# Patient Record
Sex: FEMALE | Race: WHITE
[De-identification: names, ages, dates, MRNs, and addresses within clinical notes are randomized per-mention and may not be internally consistent; named-entity substitution may affect disease eponyms.]

---

## 2018-12-28 ENCOUNTER — HOSPITAL ENCOUNTER (EMERGENCY)
Dept: HOSPITAL 97 - ER | Age: 63
Discharge: HOME | End: 2018-12-28
Payer: COMMERCIAL

## 2018-12-28 VITALS — OXYGEN SATURATION: 98 % | SYSTOLIC BLOOD PRESSURE: 148 MMHG | DIASTOLIC BLOOD PRESSURE: 79 MMHG

## 2018-12-28 VITALS — TEMPERATURE: 98.6 F
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
## 2018-12-28 DIAGNOSIS — M81.0: ICD-10-CM

## 2018-12-28 DIAGNOSIS — M16.11: ICD-10-CM

## 2018-12-28 DIAGNOSIS — S73.101A: Primary | ICD-10-CM

## 2018-12-28 PROCEDURE — 72170 X-RAY EXAM OF PELVIS: CPT

## 2018-12-28 PROCEDURE — 93971 EXTREMITY STUDY: CPT

## 2018-12-28 PROCEDURE — 99283 EMERGENCY DEPT VISIT LOW MDM: CPT

## 2018-12-28 NOTE — RAD REPORT
EXAM DESCRIPTION:  USExtremity Venous Uni Ltd12/28/2018 8:21 pm

 

CLINICAL HISTORY:  Right leg pain

 

COMPARISON:  None.

 

FINDINGS:  Right common femoral, superficial femoral, popliteal and right posterior tibial veins are 
compressible and demonstrate augmentation. Doppler demonstrates good flow.

 

IMPRESSION:  No evidence of deep venous thrombosis involving the right lower extremity.

## 2018-12-28 NOTE — ER
Nurse's Notes                                                                                     

 [facility]                                                                

Name: Kathi Massey                                                                                  

Age: 63 yrs                                                                                       

Sex: Female                                                                                       

: 1955                                                                                   

MRN: [id number]                                                                                   

Arrival Date: 2018                                                                          

Time: 17:25                                                                                       

Account#: [id number]                                                                            

Bed 20                                                                                            

Private MD: Jefferson Aaron R                                                         

Diagnosis: Sprain of hip                                                                          

                                                                                                  

Presentation:                                                                                     

                                                                                             

18:10 Presenting complaint: Patient states: Right groin pain for 2 weeks after twisting leg.  aj  

      Transition of care: patient was not received from another setting of care. Onset of         

      symptoms was 2018. Risk Assessment: Do you want to hurt yourself or            

      someone else? Patient reports no desire to harm self or others. Initial Sepsis Screen:      

      Does the patient meet any 2 criteria? No. Patient's initial sepsis screen is negative.      

      Does the patient have a suspected source of infection? No. Patient's initial sepsis         

      screen is negative. Care prior to arrival: None.                                            

18:10 Method Of Arrival: Ambulatory                                                           aj  

18:10 Acuity: NENKA 4                                                                           aj  

                                                                                                  

Triage Assessment:                                                                                

18:12 General: Appears in no apparent distress. comfortable, Behavior is calm, cooperative,   aj  

      appropriate for age. Pain: Complains of pain in right inner thigh and medial aspect of      

      right thigh. Neuro: Level of Consciousness is awake, alert, obeys commands, Oriented to     

      person, place, time, situation, Appropriate for age. Respiratory: Airway is patent          

      Respiratory effort is even, unlabored, Respiratory pattern is regular, symmetrical.         

      Derm: Skin is intact, is healthy with good turgor, Skin is pink, warm \T\ dry. normal.      

      Musculoskeletal: Reports pain in right inner thigh and medial aspect of right thigh.        

                                                                                                  

Historical:                                                                                       

- Allergies:                                                                                      

18:12 No Known Allergies;                                                                     aj  

- Home Meds:                                                                                      

18:12 baclofen 20 mg Oral tab 1 tab nightly [Active]; diclofenac oral oral [Active];          aj  

- PMHx:                                                                                           

18:12 Osteoporosis; herniated discs;                                                          aj  

- PSHx:                                                                                           

18:12 Hysterectomy; Carpal Tunnel Repair; foot;                                               aj  

                                                                                                  

- Immunization history:: Adult Immunizations up to date.                                          

- Social history:: Smoking status: Patient/guardian denies using tobacco.                         

- Ebola Screening: : Patient negative for fever greater than or equal to 101.5 degrees            

  Fahrenheit, and additional compatible Ebola Virus Disease symptoms Patient denies               

  exposure to infectious person Patient denies travel to an Ebola-affected area in the            

  21 days before illness onset No symptoms or risks identified at this time.                      

                                                                                                  

                                                                                                  

Screenin:20 Abuse screen: Denies threats or abuse. Denies injuries from another. Nutritional        ca1 

      screening: No deficits noted. Tuberculosis screening: No symptoms or risk factors           

      identified. Fall Risk None identified.                                                      

                                                                                                  

Assessment:                                                                                       

19:20 General: Appears in no apparent distress. comfortable, Behavior is calm, cooperative,   ca1 

      appropriate for age. Pain: Complains of pain in right leg Pain currently is 5 out of 10     

      on a pain scale. Quality of pain is described as squeezing, Pain began 3 weeks ago.         

      Neuro: Level of Consciousness is awake, alert, obeys commands, Oriented to person,          

      place, time, situation, Appropriate for age. Cardiovascular: Heart tones S1 S2 present      

      Capillary refill < 3 seconds Patient's skin is warm and dry. Edema is 1+ to right knee.     

      Respiratory: Airway is patent Trachea midline Respiratory effort is even, unlabored,        

      Respiratory pattern is regular, symmetrical, Breath sounds are clear bilaterally. GI:       

      Abdomen is round non-distended, Bowel sounds present X 4 quads. Abd is soft and non         

      tender X 4 quads. : No signs and/or symptoms were reported regarding the                  

      genitourinary system. EENT: No signs and/or symptoms were reported regarding the EENT       

      system. Derm: Skin is intact, is healthy with good turgor, Skin is pink, warm \T\ dry.      

      Musculoskeletal: Circulation, motion, and sensation intact. Range of motion: intact in      

      all extremities, Swelling present in right leg.                                             

20:37 Reassessment: Patient appears in no apparent distress at this time. Patient and/or      ca1 

      family updated on plan of care and expected duration. Pain level reassessed. Patient is     

      alert, oriented x 3, equal unlabored respirations, skin warm/dry/pink. Patient is           

      alert/active/playful, equal unlabored respirations, skin warm/dry/pink.                     

21:15 Reassessment: Patient appears in no apparent distress at this time. Patient and/or      ca1 

      family updated on plan of care and expected duration. Pain level reassessed. Patient is     

      alert, oriented x 3, equal unlabored respirations, skin warm/dry/pink. Patient is           

      alert/active/playful, equal unlabored respirations, skin warm/dry/pink.                     

                                                                                                  

Vital Signs:                                                                                      

18:12  / 85; Pulse 75; Resp 20; Temp 98.6; Pulse Ox 100% on R/A; Weight 75.3 kg; Height aj  

      5 ft. 1 in. (154.94 cm);                                                                    

19:20  / 69; Pulse 58; Resp 18; Pulse Ox 98% on R/A;                                    ca1 

20:37  / 71; Pulse 60; Resp 18; Pulse Ox 97% ;                                          ca1 

21:15  / 79; Pulse 62; Resp 18; Pulse Ox 98% on R/A;                                    ca1 

18:12 Body Mass Index 31.37 (75.30 kg, 154.94 cm)                                             aj  

                                                                                                  

ED Course:                                                                                        

17:25 Patient arrived in ED.                                                                  sb2 

17:25 Jefferson Aaron MD is Private Physician.                                    sb2 

18:11 Triage completed.                                                                       aj  

18:12 Arm band placed on left wrist. Patient placed in waiting room, Patient notified of wait aj  

      time.                                                                                       

19:03 Rajinder Smith PA is PHCP.                                                              [facility] 

19:03 Arturo Hooper MD is Attending Physician.                                             m 

19:20 Patient has correct armband on for positive identification. Bed in low position. Call   ca1 

      light in reach. Side rails up X 1.                                                          

19:43 Concepcion Novoa, RN is Primary Nurse.                                                      aa1 

19:59 Hip Right 2 View XRAY In Process Unspecified.                                           EDMS

19:59 Pelvis XRAY In Process Unspecified.                                                     EDMS

20:20 Ultrasound completed. Patient tolerated well.                                           sg3 

20:21 US Extremity Venous Unilateral Ltd In Process Unspecified.                              EDMS

20:29 Mandy Macias, RN is Primary Nurse.                                                      ca1 

21:08 Irving Pat MD is Referral Physician.                                               [facility] 

21:16 No provider procedures requiring assistance completed. Patient did not have IV access   ca1 

      during this emergency room visit.                                                           

                                                                                                  

Administered Medications:                                                                         

No medications were administered                                                                  

                                                                                                  

                                                                                                  

Outcome:                                                                                          

21:08 Discharge ordered by MD.                                                                m 

21:16 Discharged to home ambulatory.                                                          ca1 

21:16 Condition: stable                                                                           

21:16 Discharge instructions given to patient, Instructed on discharge instructions, follow       

      up and referral plans. Demonstrated understanding of instructions, follow-up care.          

21:18 Patient left the ED.                                                                    ca1 

                                                                                                  

Signatures:                                                                                       

Dispatcher [employer]                           EDMS                                                 

Concepcion Novoa RN                        RN   aa1                                                  

Paula Wade RN                       RN   aj                                                   

Mickail, NOHEMI Calvillo, Barb                               sg3                                                  

Tuyet Powell                               sb2                                                  

Mandy Macias, RN                        RN   ca1                                                  

                                                                                                  

**************************************************************************************************

## 2018-12-28 NOTE — RAD REPORT
EXAM DESCRIPTION:  RAD - Hip Right 2 View - 12/28/2018 7:59 pm

 

CLINICAL HISTORY:  Right hip pain

 

FINDINGS:  No fracture or dislocation is seen.

 

Mild to moderate osteoarthritis involves right hip consisting joint space narrowing and subchondral s
clerosis. Bones appear osteoporotic

## 2018-12-28 NOTE — EDPHYS
Physician Documentation                                                                           

 [facility]                                                                

Name: Kathi Massey                                                                                  

Age: 63 yrs                                                                                       

Sex: Female                                                                                       

: 1955                                                                                   

MRN: [id number]                                                                                   

Arrival Date: 2018                                                                          

Time: 17:25                                                                                       

Account#: [id number]                                                                            

Bed 20                                                                                            

Private MD: Jefferson Aaron R                                                         

ED Physician Arturo Hooper                                                                      

HPI:                                                                                              

                                                                                             

19:08 This 63 yrs old  Female presents to ER via Ambulatory with complaints of Leg   jmm 

      Pain.                                                                                       

19:08 The patient presents with an injury, pain. Onset: The symptoms/episode began/occurred   jmm 

      acutely, 2 week(s) ago. Modifying factors: The symptoms are alleviated by remaining         

      still, the symptoms are aggravated by movement. Associated signs and symptoms:              

      Pertinent negatives calf tenderness, fever, numbness. This is a 63 year old female with     

      a history of osteoporosis that presents to the ED with right hip pain after twisting.       

      Patient states the pain radiates down the right anterior thigh. Denies weakness or          

      fever. .                                                                                    

                                                                                                  

Historical:                                                                                       

- Allergies:                                                                                      

18:12 No Known Allergies;                                                                     aj  

- Home Meds:                                                                                      

18:12 baclofen 20 mg Oral tab 1 tab nightly [Active]; diclofenac oral oral [Active];          aj  

- PMHx:                                                                                           

18:12 Osteoporosis; herniated discs;                                                          aj  

- PSHx:                                                                                           

18:12 Hysterectomy; Carpal Tunnel Repair; foot;                                               aj  

                                                                                                  

- Immunization history:: Adult Immunizations up to date.                                          

- Social history:: Smoking status: Patient/guardian denies using tobacco.                         

- Ebola Screening: : Patient negative for fever greater than or equal to 101.5 degrees            

  Fahrenheit, and additional compatible Ebola Virus Disease symptoms Patient denies               

  exposure to infectious person Patient denies travel to an Ebola-affected area in the            

  21 days before illness onset No symptoms or risks identified at this time.                      

                                                                                                  

                                                                                                  

ROS:                                                                                              

19:08 Constitutional: Negative for fever, chills, and weight loss, Cardiovascular: Negative   jmm 

      for chest pain, palpitations, and edema, Respiratory: Negative for shortness of breath,     

      cough, wheezing, and pleuritic chest pain.                                                  

19:08 MS/extremity: Positive for pain.                                                            

19:08 All other systems are negative.                                                             

                                                                                                  

Exam:                                                                                             

19:08 Head/Face:  atraumatic. Eyes:  EOMI, no conjunctival erythema appreciated ENT:  Moist   jmm 

      Mucus Membranes Neck:  Trachea midline, Supple Chest/axilla:  Normal chest wall             

      appearance and motion.   Cardiovascular:  Regular rate and rhythm.  No edema                

      appreciated Respiratory:  Normal respirations, no respiratory distress appreciated          

      Back:  Normal ROM                                                                           

19:08 Constitutional: The patient appears in no acute distress, alert, awake.                     

19:08 Musculoskeletal/extremity: ROM: pain with internal rotation of the right hip.   no          

      swelling is appreciated to the right lower extremity.  full dorsalis pedis pulse, NVI.      

19:08 Skin: Appearance: Color: normal in color.                                                   

19:08 Neuro: Orientation: is normal, Mentation: is normal, Memory: is normal.                     

19:08 Psych: Behavior/mood is pleasant, cooperative.                                              

                                                                                                  

Vital Signs:                                                                                      

18:12  / 85; Pulse 75; Resp 20; Temp 98.6; Pulse Ox 100% on R/A; Weight 75.3 kg; Height aj  

      5 ft. 1 in. (154.94 cm);                                                                    

19:20  / 69; Pulse 58; Resp 18; Pulse Ox 98% on R/A;                                    ca1 

20:37  / 71; Pulse 60; Resp 18; Pulse Ox 97% ;                                          ca1 

21:15  / 79; Pulse 62; Resp 18; Pulse Ox 98% on R/A;                                    ca1 

18:12 Body Mass Index 31.37 (75.30 kg, 154.94 cm)                                             aj  

                                                                                                  

MDM:                                                                                              

19:08 Patient medically screened.                                                             marilee 

21:07 Data reviewed: vital signs, nurses notes. Counseling: I had a detailed discussion with  brian 

      the patient and/or guardian regarding: the historical points, exam findings, and any        

      diagnostic results supporting the discharge/admit diagnosis, radiology results, the         

      need for outpatient follow up, to return to the emergency department if symptoms worsen     

      or persist or if there are any questions or concerns that arise at home.                    

                                                                                                  

                                                                                             

19:23 Order name: Hip Right 2 View XRAY; Complete Time: 20:38                                 [facility] 

                                                                                             

19:23 Order name: Pelvis XRAY; Complete Time: 20:38                                           [facility] 

                                                                                             

19:23 Order name: US Extremity Venous Unilateral Ltd; Complete Time: 20:38                    [facility] 

                                                                                                  

Administered Medications:                                                                         

No medications were administered                                                                  

                                                                                                  

                                                                                                  

Disposition:                                                                                      

18 21:08 Discharged to Home. Impression: Sprain of hip.                                     

- Condition is Stable.                                                                            

- Discharge Instructions: Hip Pain.                                                               

                                                                                                  

- Medication Reconciliation Form, Thank You Letter, Antibiotic Education, Prescription            

  Opioid Use form.                                                                                

- Follow up: Irving Pat MD; When: 2 - 3 days; Reason: Recheck today's complaints,            

  Continuance of care, Re-evaluation by your physician.                                           

                                                                                                  

                                                                                                  

                                                                                                  

Addendum:                                                                                         

2019                                                                                        

     11:08 Co-signature as Attending Physician, Arturo Hooper MD I agree with the assessment and  c
ha

           plan of care.                                                                          

                                                                                                  

Signatures:                                                                                       

Dispatcher [employer]                           Paula Perez, RN                       RN   Arturo Curiel MD MD cha Mickail, Joel, PA PA   Mandy Albert RN                        RN   ca1                                                  

                                                                                                  

Corrections: (The following items were deleted from the chart)                                    

                                                                                             

21:18 21:08 2018 21:08 Discharged to Home. Impression: Sprain of hip. Condition is      ca1 

      Stable. Forms are Medication Reconciliation Form, Thank You Letter, Antibiotic              

      Education, Prescription Opioid Use. Follow up: Irving Pat; When: 2 - 3 days; Reason:     

      Recheck today's complaints, Continuance of care, Re-evaluation by your physician. brian       

                                                                                                  

**************************************************************************************************

## 2018-12-28 NOTE — RAD REPORT
EXAM DESCRIPTION:  RAD - Pelvis - 12/28/2018 7:59 pm

 

CLINICAL HISTORY:  Right hip pain

 

FINDINGS:  No fracture or dislocation is seen.

 

Mild to moderate osteoarthritis involves the hips mainly consisting joint space narrowing and subchon
dral sclerosis.

 

Bones appear osteoporotic

## 2019-09-07 ENCOUNTER — HOSPITAL ENCOUNTER (EMERGENCY)
Dept: HOSPITAL 97 - ER | Age: 64
Discharge: HOME | End: 2019-09-07
Payer: COMMERCIAL

## 2019-09-07 DIAGNOSIS — H81.10: Primary | ICD-10-CM

## 2019-09-07 PROCEDURE — 99283 EMERGENCY DEPT VISIT LOW MDM: CPT

## 2019-09-07 NOTE — EDPHYS
Physician Documentation                                                                           

 [facility]                                                                 

Name: Kathi Massey                                                                                  

Age: 64 yrs                                                                                       

Sex: Female                                                                                       

: 1955                                                                                   

MRN: [id number]                                                                                   

Arrival Date: 2019                                                                          

Time: 19:56                                                                                       

Account#: [id number]                                                                            

Bed 14                                                                                            

Private MD: Jefferson Aaron R                                                         

ED Physician Mil Tran                                                                       

HPI:                                                                                              

                                                                                             

20:48 This 64 yrs old  Female presents to ER via Ambulatory with complaints of       gs  

      Nausea, Dizziness.                                                                          

20:48 The patient presents with sense of spinning. Onset: The symptoms/episode began/occurred gs  

      this morning. Context: occurred while the patient was sitting. Modifying factors: the       

      symptoms are aggravated by movement of head, standing up, changing position. Associated     

      signs and symptoms: Pertinent positives: nausea, vomiting. Severity of symptoms: At         

      their worst the symptoms were severe in the emergency department the symptoms have          

      improved markedly. The patient has experienced similar episodes in the past, a few          

      times.                                                                                      

                                                                                                  

Historical:                                                                                       

- Allergies:                                                                                      

20:08 No Known Allergies;                                                                     aj1 

- Home Meds:                                                                                      

20:08 Flexeril Oral [Active]; Naprosyn Oral [Active];                                         aj1 

- PMHx:                                                                                           

20:08 herniated discs; Osteoporosis;                                                          aj1 

                                                                                                  

- Immunization history:: Flu vaccine is up to date.                                               

- Social history:: Smoking status: Patient/guardian denies using tobacco.                         

- Ebola Screening: : Patient denies travel to an Ebola-affected area in the 21 days               

  before illness onset.                                                                           

                                                                                                  

                                                                                                  

ROS:                                                                                              

20:48 All other systems are negative.                                                         gs  

                                                                                                  

Exam:                                                                                             

20:48 Head/Face:  Normocephalic, atraumatic. Eyes:  Pupils equal round and reactive to light, gs  

      extra-ocular motions intact.  Lids and lashes normal.  Conjunctiva and sclera are           

      non-icteric and not injected.  Cornea within normal limits.  Periorbital areas with no      

      swelling, redness, or edema. ENT:  Nares patent. No nasal discharge, no septal              

      abnormalities noted.  Tympanic membranes are normal and external auditory canals are        

      clear.  Oropharynx with no redness, swelling, or masses, exudates, or evidence of           

      obstruction, uvula midline.  Mucous membranes moist. Neck:  Trachea midline, no             

      thyromegaly or masses palpated, and no cervical lymphadenopathy.  Supple, full range of     

      motion without nuchal rigidity, or vertebral point tenderness.  No Meningismus.             

      Chest/axilla:  Normal chest wall appearance and motion.  Nontender with no deformity.       

      No lesions are appreciated. Cardiovascular:  Regular rate and rhythm with a normal S1       

      and S2.  No gallops, murmurs, or rubs.  Normal PMI, no JVD.  No pulse deficits.             

      Respiratory:  Lungs have equal breath sounds bilaterally, clear to auscultation and         

      percussion.  No rales, rhonchi or wheezes noted.  No increased work of breathing, no        

      retractions or nasal flaring. Abdomen/GI:  Soft, non-tender, with normal bowel sounds.      

      No distension or tympany.  No guarding or rebound.  No evidence of tenderness               

      throughout. Back:  No spinal tenderness.  No costovertebral tenderness.  Full range of      

      motion. Skin:  Warm, dry with normal turgor.  Normal color with no rashes, no lesions,      

      and no evidence of cellulitis. MS/ Extremity:  Pulses equal, no cyanosis.                   

      Neurovascular intact.  Full, normal range of motion.                                        

20:48 Constitutional: The patient appears alert, awake.                                           

20:48 Eyes: Nystagmus: is not appreciated.                                                        

20:48 Neuro: Orientation: is normal, to person, place, time \T\ situation. Mentation: is          

      normal, Memory: is normal, Cranial nerves: CN II- XII are normal as tested, Cerebellar      

      function: normal finger to nose testing, Motor: strength is normal, Sensation: no           

      obvious gross deficits.                                                                     

                                                                                                  

Vital Signs:                                                                                      

20:08  / 70; Pulse 101; Resp 18; Temp 98.4; Pulse Ox 98% on R/A; Weight 77.11 kg (R);   aj1 

      Height 5 ft. 0 in. (152.40 cm) (R); Pain 0/10;                                              

20:35  / 68; Pulse 94; Resp 18; Pulse Ox 97% on R/A;                                    lp1 

20:08 Body Mass Index 33.20 (77.11 kg, 152.40 cm)                                             aj1 

                                                                                                  

NIH Stroke Scale Scores:                                                                          

20:48 NIHSS Score: 0                                                                          gs  

                                                                                                  

MDM:                                                                                              

20:48 Patient medically screened.                                                             gs  

20:48 Differential diagnosis: idiopathic dizziness, vertigo. Data reviewed: vital signs,      gs  

      nurses notes. Counseling: I had a detailed discussion with the patient and/or guardian      

      regarding: the historical points, exam findings, and any diagnostic results supporting      

      the discharge/admit diagnosis, the need for outpatient follow up. Response to               

      treatment: the patient's symptoms have resolved after treatment, and as a result, I         

      will discharge patient.                                                                     

                                                                                                  

Administered Medications:                                                                         

No medications were administered                                                                  

                                                                                                  

                                                                                                  

Disposition:                                                                                      

19 20:53 Discharged to Home. Impression: Benign paroxysmal vertigo.                         

- Condition is Stable.                                                                            

- Discharge Instructions: Benign Positional Vertigo.                                              

- Prescriptions for Antivert 25 mg Oral Tablet - take 1 tablet by ORAL route every 8              

  hours As needed; 15 tablet.                                                                     

- Work release form, Medication Reconciliation Form, Thank You Letter, Antibiotic                 

  Education, Prescription Opioid Use form.                                                        

- Follow up: Private Physician; When: 2 - 3 days; Reason: Re-evaluation by your                   

  physician.                                                                                      

                                                                                                  

                                                                                                  

                                                                                                  

                                                                                                  

NIH Stroke Scale - NIH Stroke Score                                                               

Date: 2019                                                                                  

Time: 20:48                                                                                       

Total Score = 0                                                                                   

  1a. Level of Consciousness (LOC) - 0(Alert)                                                     

  1b. Level of Consciousness (LOC) (Year \T\ Age) - 0(Both)                                       

  1c. LOC Commands (Open \T\ Closes Eyes/) - 0(Both)                                          

   2. Best Gaze (Lateral Gaze Paresis) - 0(Normal)                                                

   3. Visual Field Loss - 0(No visual loss)                                                       

   4. Facial Palsy - 0(Normal)                                                                    

  5a. Left Arm: Motor (10-second hold) - 0(No drift)                                              

  5b. Right Arm: Motor (10-second hold) - 0(No drift)                                             

  6a. Left Leg: Motor (5-second hold - always test supine) - 0(No drift)                          

  6b. Right Leg: Motor (5-second hold - always test supine) - 0(No drift)                         

   7. Limb Ataxia (finger/nose \T\ heel/shin - test with eyes open) - 0(Absent)                   

   8. Sensory Loss (pinprick arms/legs/face) - 0(Normal)                                          

   9. Best Language: Aphasia (description/naming/reading) - 0(No aphasia)                         

  10. Dysarthria (speech clarity - read or repeat words) - 0(Normal)                              

  11. Extinction and Inattention (visual/tactile/auditory/spatial/personal) - 0(No                

      abnormality)                                                                                

Initials:                                                                                       

                                                                                                  

Signatures:                                                                                       

Kimberli Bajwa RN                     RN   aj1                                                  

Agatha Jalloh RN                         RN   lp1                                                  

Mil Tran MD MD gs                                                   

                                                                                                  

Corrections: (The following items were deleted from the chart)                                    

21:09 20:53 2019 20:53 Discharged to Home. Impression: Benign paroxysmal        lp1         

      vertigo. Condition is Stable. Forms are Medication Reconciliation Form, Thank               

      You Letter, Antibiotic Education, Prescription Opioid Use. Follow up: Private               

      Physician; When: 2 - 3 days; Reason: Re-evaluation by your physician. gs                    

                                                                                                  

**************************************************************************************************

## 2019-09-07 NOTE — ER
Nurse's Notes                                                                                     

 [facility]                                                                 

Name: Kathi Massye                                                                                  

Age: 64 yrs                                                                                       

Sex: Female                                                                                       

: 1955                                                                                   

MRN: [id number]                                                                                   

Arrival Date: 2019                                                                          

Time: 19:56                                                                                       

Account#: [id number]                                                                            

Bed 14                                                                                            

Private MD: Jefferson Aaron R                                                         

Diagnosis: Benign paroxysmal vertigo                                                              

                                                                                                  

Presentation:                                                                                     

                                                                                             

20:06 Presenting complaint: Patient states: Nausea, dizziness, not feeling well that started  aj1 

      this morning. Denies vomiting. Reports chills. Transition of care: patient was not          

      received from another setting of care. Onset of symptoms was 2019. Risk       

      Assessment: Do you want to hurt yourself or someone else? Patient reports no desire to      

      harm self or others. Initial Sepsis Screen: Does the patient meet any 2 criteria? HR >      

      90 bpm. No. Patient's initial sepsis screen is negative. Does the patient have a            

      suspected source of infection? No. Patient's initial sepsis screen is negative. Care        

      prior to arrival: None.                                                                     

20:06 Method Of Arrival: Ambulatory                                                           aj1 

20:06 Acuity: NNEKA 3                                                                           aj1 

                                                                                                  

Triage Assessment:                                                                                

20:08 General: Appears in no apparent distress. comfortable, Behavior is calm, cooperative,   aj1 

      appropriate for age. Pain: Denies pain. Neuro: Level of Consciousness is awake, alert,      

      obeys commands. Cardiovascular: Patient's skin is warm and dry. Respiratory: Airway is      

      patent Respiratory effort is even, unlabored, Respiratory pattern is regular,               

      symmetrical. GI: Reports nausea.                                                            

                                                                                                  

Historical:                                                                                       

- Allergies:                                                                                      

20:08 No Known Allergies;                                                                     aj1 

- Home Meds:                                                                                      

20:08 Flexeril Oral [Active]; Naprosyn Oral [Active];                                         aj1 

- PMHx:                                                                                           

20:08 herniated discs; Osteoporosis;                                                          aj1 

                                                                                                  

- Immunization history:: Flu vaccine is up to date.                                               

- Social history:: Smoking status: Patient/guardian denies using tobacco.                         

- Ebola Screening: : Patient denies travel to an Ebola-affected area in the 21 days               

  before illness onset.                                                                           

                                                                                                  

                                                                                                  

Screenin:35 Abuse screen: Denies threats or abuse. Denies injuries from another. Nutritional        lp1 

      screening: No deficits noted. Tuberculosis screening: No symptoms or risk factors           

      identified. Fall Risk None identified.                                                      

                                                                                                  

Assessment:                                                                                       

20:34 General: Appears in no apparent distress. Behavior is calm, cooperative, appropriate    lp1 

      for age. Pain: Denies pain. Neuro: Level of Consciousness is awake, alert, obeys            

      commands, Oriented to person, place, time, situation, Gait is steady, Reports               

      dizziness. Cardiovascular: Patient's skin is warm and dry. Respiratory: Respiratory         

      effort is even, unlabored. GI: Abdomen is non-distended, Abd is soft and non tender X 4     

      quads. Reports nausea. : No signs and/or symptoms were reported regarding the             

      genitourinary system. EENT: No signs and/or symptoms were reported regarding the EENT       

      system. Derm: Skin is pink, warm \T\ dry. Musculoskeletal: No deficits noted.               

                                                                                                  

Vital Signs:                                                                                      

20:08  / 70; Pulse 101; Resp 18; Temp 98.4; Pulse Ox 98% on R/A; Weight 77.11 kg (R);   aj1 

      Height 5 ft. 0 in. (152.40 cm) (R); Pain 0/10;                                              

20:35  / 68; Pulse 94; Resp 18; Pulse Ox 97% on R/A;                                    lp1 

20:08 Body Mass Index 33.20 (77.11 kg, 152.40 cm)                                             aj1 

                                                                                                  

NIH Stroke Scale Scores:                                                                          

20:48 NIHSS Score: 0                                                                            

                                                                                                  

ED Course:                                                                                        

19:56 Patient arrived in ED.                                                                  mr  

19:56 Jefferson Aaron MD is Private Physician.                                    mr  

20:07 Triage completed.                                                                       aj1 

20:08 Arm band placed on Patient placed in an exam room.                                      aj1 

20:21 Agatha Jalloh, RN is Primary Nurse.                                                       lp1 

20:21 Mil Tran MD is Attending Physician.                                              gs  

20:35 Patient has correct armband on for positive identification. Placed in gown. Pulse ox    lp1 

      on. NIBP on.                                                                                

21:06 No provider procedures requiring assistance completed. Patient did not have IV access   lp1 

      during this emergency room visit.                                                           

                                                                                                  

Administered Medications:                                                                         

No medications were administered                                                                  

                                                                                                  

                                                                                                  

Outcome:                                                                                          

20:53 Discharge ordered by MD.                                                                gs  

21:06 Discharged to home ambulatory.                                                          lp1 

21:06 Condition: good                                                                             

21:06 Discharge instructions given to patient, Instructed on discharge instructions, follow       

      up and referral plans. medication usage, Demonstrated understanding of instructions,        

      follow-up care, medications, Prescriptions given X 1.                                       

21:09 Patient left the ED.                                                                    lp1 

                                                                                                  

                                                                                                  

NIH Stroke Scale - NIH Stroke Score                                                               

Date: 2019                                                                                  

Time: 20:48                                                                                       

Total Score = 0                                                                                   

  1a. Level of Consciousness (LOC) - 0(Alert)                                                     

  1b. Level of Consciousness (LOC) (Year \T\ Age) - 0(Both)                                       

  1c. LOC Commands (Open \T\ Closes Eyes/) - 0(Both)                                          

   2. Best Gaze (Lateral Gaze Paresis) - 0(Normal)                                                

   3. Visual Field Loss - 0(No visual loss)                                                       

   4. Facial Palsy - 0(Normal)                                                                    

  5a. Left Arm: Motor (10-second hold) - 0(No drift)                                              

  5b. Right Arm: Motor (10-second hold) - 0(No drift)                                             

  6a. Left Leg: Motor (5-second hold - always test supine) - 0(No drift)                          

  6b. Right Leg: Motor (5-second hold - always test supine) - 0(No drift)                         

   7. Limb Ataxia (finger/nose \T\ heel/shin - test with eyes open) - 0(Absent)                   

   8. Sensory Loss (pinprick arms/legs/face) - 0(Normal)                                          

   9. Best Language: Aphasia (description/naming/reading) - 0(No aphasia)                         

  10. Dysarthria (speech clarity - read or repeat words) - 0(Normal)                              

  11. Extinction and Inattention (visual/tactile/auditory/spatial/personal) - 0(No                

      abnormality)                                                                                

Initials:                                                                                       

                                                                                                  

Signatures:                                                                                       

Kimberli Bajwa, RN                     RN   aj1                                                  

Diana Banda                                 mr                                                   

Agatha Jalloh, THOMAS                         RN   lp1                                                  

Mil Tran MD MD                                                      

                                                                                                  

**************************************************************************************************

## 2019-09-08 VITALS — SYSTOLIC BLOOD PRESSURE: 120 MMHG | DIASTOLIC BLOOD PRESSURE: 68 MMHG | OXYGEN SATURATION: 97 %

## 2019-09-08 VITALS — TEMPERATURE: 98.4 F

## 2020-10-01 LAB
BUN BLD-MCNC: 13 MG/DL (ref 7–18)
GLUCOSE SERPLBLD-MCNC: 85 MG/DL (ref 74–106)
HCT VFR BLD CALC: 36.2 % (ref 36–45)
INR BLD: 0.98
LYMPHOCYTES # SPEC AUTO: 2.9 K/UL (ref 0.7–4.9)
PMV BLD: 8.5 FL (ref 7.6–11.3)
POTASSIUM SERPL-SCNC: 4 MMOL/L (ref 3.5–5.1)
RBC # BLD: 3.79 M/UL (ref 3.86–4.86)
UA DIPSTICK W REFLEX MICRO PNL UR: (no result)

## 2020-10-06 ENCOUNTER — HOSPITAL ENCOUNTER (OUTPATIENT)
Dept: HOSPITAL 97 - OR | Age: 65
LOS: 1 days | Discharge: HOME HEALTH | End: 2020-10-07
Attending: OBSTETRICS & GYNECOLOGY
Payer: COMMERCIAL

## 2020-10-06 VITALS — OXYGEN SATURATION: 94 %

## 2020-10-06 VITALS — BODY MASS INDEX: 32.8 KG/M2

## 2020-10-06 DIAGNOSIS — Z83.3: ICD-10-CM

## 2020-10-06 DIAGNOSIS — Z80.6: ICD-10-CM

## 2020-10-06 DIAGNOSIS — N99.3: Primary | ICD-10-CM

## 2020-10-06 DIAGNOSIS — N95.2: ICD-10-CM

## 2020-10-06 DIAGNOSIS — N39.3: ICD-10-CM

## 2020-10-06 DIAGNOSIS — N73.6: ICD-10-CM

## 2020-10-06 DIAGNOSIS — Z82.61: ICD-10-CM

## 2020-10-06 DIAGNOSIS — Z20.828: ICD-10-CM

## 2020-10-06 DIAGNOSIS — Z82.49: ICD-10-CM

## 2020-10-06 PROCEDURE — 86900 BLOOD TYPING SEROLOGIC ABO: CPT

## 2020-10-06 PROCEDURE — 90471 IMMUNIZATION ADMIN: CPT

## 2020-10-06 PROCEDURE — 81003 URINALYSIS AUTO W/O SCOPE: CPT

## 2020-10-06 PROCEDURE — 49329 UNLSTD LAPS PX ABD PERTM&OMN: CPT

## 2020-10-06 PROCEDURE — 85610 PROTHROMBIN TIME: CPT

## 2020-10-06 PROCEDURE — 85730 THROMBOPLASTIN TIME PARTIAL: CPT

## 2020-10-06 PROCEDURE — 85025 COMPLETE CBC W/AUTO DIFF WBC: CPT

## 2020-10-06 PROCEDURE — 0UT24ZZ RESECTION OF BILATERAL OVARIES, PERCUTANEOUS ENDOSCOPIC APPROACH: ICD-10-PCS

## 2020-10-06 PROCEDURE — 0DNW4ZZ RELEASE PERITONEUM, PERCUTANEOUS ENDOSCOPIC APPROACH: ICD-10-PCS

## 2020-10-06 PROCEDURE — 0UT74ZZ RESECTION OF BILATERAL FALLOPIAN TUBES, PERCUTANEOUS ENDOSCOPIC APPROACH: ICD-10-PCS

## 2020-10-06 PROCEDURE — 80048 BASIC METABOLIC PNL TOTAL CA: CPT

## 2020-10-06 PROCEDURE — 94010 BREATHING CAPACITY TEST: CPT

## 2020-10-06 PROCEDURE — 58661 LAPAROSCOPY REMOVE ADNEXA: CPT

## 2020-10-06 PROCEDURE — 0USG4ZZ REPOSITION VAGINA, PERCUTANEOUS ENDOSCOPIC APPROACH: ICD-10-PCS

## 2020-10-06 PROCEDURE — 36415 COLL VENOUS BLD VENIPUNCTURE: CPT

## 2020-10-06 PROCEDURE — 88305 TISSUE EXAM BY PATHOLOGIST: CPT

## 2020-10-06 PROCEDURE — 86901 BLOOD TYPING SEROLOGIC RH(D): CPT

## 2020-10-06 PROCEDURE — 86850 RBC ANTIBODY SCREEN: CPT

## 2020-10-06 PROCEDURE — 57425 LAPAROSCOPY SURG COLPOPEXY: CPT

## 2020-10-06 RX ADMIN — SODIUM CHLORIDE, SODIUM LACTATE, POTASSIUM CHLORIDE, AND CALCIUM CHLORIDE ONE MLS: .6; .31; .03; .02 INJECTION, SOLUTION INTRAVENOUS at 14:05

## 2020-10-06 RX ADMIN — SODIUM CHLORIDE, SODIUM LACTATE, POTASSIUM CHLORIDE, AND CALCIUM CHLORIDE ONE MLS: .6; .31; .03; .02 INJECTION, SOLUTION INTRAVENOUS at 15:05

## 2020-10-06 RX ADMIN — BUPIVACAINE HYDROCHLORIDE ONE ML: 2.5 INJECTION, SOLUTION EPIDURAL; INFILTRATION; INTRACAUDAL at 12:23

## 2020-10-06 RX ADMIN — BUPIVACAINE HYDROCHLORIDE ONE ML: 2.5 INJECTION, SOLUTION EPIDURAL; INFILTRATION; INTRACAUDAL at 12:12

## 2020-10-06 RX ADMIN — HYDROMORPHONE HYDROCHLORIDE ONE MG: 1 INJECTION, SOLUTION INTRAMUSCULAR; INTRAVENOUS; SUBCUTANEOUS at 17:58

## 2020-10-06 RX ADMIN — HYDROMORPHONE HYDROCHLORIDE ONE MG: 1 INJECTION, SOLUTION INTRAMUSCULAR; INTRAVENOUS; SUBCUTANEOUS at 18:03

## 2020-10-06 RX ADMIN — SODIUM CHLORIDE, SODIUM LACTATE, POTASSIUM CHLORIDE, AND CALCIUM CHLORIDE SCH MLS: .6; .31; .03; .02 INJECTION, SOLUTION INTRAVENOUS at 19:20

## 2020-10-06 NOTE — CON
Date of Consultation:  10/06/2020



Reason:  The patient needs dissection of the sacral promontory and the presacral area for placement o
f the mesh and Dr. Cooper asked me to help assist in this region as it is a complicated dissection.
  Therefore, I scrubbed in and at this point, peritoneal surface was identified and careful dissectio
n was done to the sacral promontory.  Middle sacral and branches were cauterized.  The remainder of t
he peritoneum was opened all the way to where the mesh was being attached in the pelvis and then afte
r adequate dissection was done and bleeding was controlled, no evidence of rectal injury was noted.  
Dr. Cooper continued with the case.





AS/ZHOU

DD:  10/06/2020 15:27:00Voice ID:  [id number]

DT:  10/06/2020 19:50:37Report ID:  [id number]

## 2020-10-07 VITALS — SYSTOLIC BLOOD PRESSURE: 135 MMHG | TEMPERATURE: 98.5 F | DIASTOLIC BLOOD PRESSURE: 73 MMHG

## 2020-10-07 LAB
BUN BLD-MCNC: 11 MG/DL (ref 7–18)
GLUCOSE SERPLBLD-MCNC: 92 MG/DL (ref 74–106)
HCT VFR BLD CALC: 33.7 % (ref 36–45)
LYMPHOCYTES # SPEC AUTO: 3 K/UL (ref 0.7–4.9)
PMV BLD: 8.8 FL (ref 7.6–11.3)
POTASSIUM SERPL-SCNC: 4.2 MMOL/L (ref 3.5–5.1)
RBC # BLD: 3.58 M/UL (ref 3.86–4.86)

## 2020-10-07 RX ADMIN — SODIUM CHLORIDE, SODIUM LACTATE, POTASSIUM CHLORIDE, AND CALCIUM CHLORIDE SCH: .6; .31; .03; .02 INJECTION, SOLUTION INTRAVENOUS at 10:00

## 2020-10-07 RX ADMIN — SODIUM CHLORIDE, SODIUM LACTATE, POTASSIUM CHLORIDE, AND CALCIUM CHLORIDE SCH MLS: .6; .31; .03; .02 INJECTION, SOLUTION INTRAVENOUS at 02:45

## 2020-10-08 NOTE — XMS REPORT
[facility]

                            Created on:2020



Patient:Kathi Massey

Sex:Female

:1955

External Reference #:[id number]





Demographics







                          Address                   [address] [place] [address]

 

                          Phone                     [phone]

 

                          Preferred Language        en

 

                          Marital Status            Unknown

 

                          Jain Affiliation     Unknown

 

                          Race                      White

 

                          Ethnic Group              Unknown









Author







                          Organization              [employer]









Care Team Providers







                    Name                Role                Phone

 

                    Shamar Stew       Provider Role       Unavailable









Allergies, Adverse Reactions, Alerts







                    Substance           Reaction            Event Type

 

                    N.K.D.A.            Info Not Available  Non Drug Allergy







Problems







             Problem Type Condition    Code         Onset Dates  Condition Statu

s

 

             Assessment   Medicare annual wellness visit, Z00.00                

    Active



                          subsequent                             

 

             Problem      Lumbago with sciatica, right side M54.41              

      Active

 

             Assessment   Asymptomatic menopausal state Z78.0                   

  Active

 

             Assessment   Encounter for screening mammogram Z12.31              

      Active



                          for malignant neoplasm of breast                      

     

 

             Problem      Female bladder prolapse N81.10                    Acti

ve

 

             Problem      History of partial hysterectomy Z90.711               

    Active

 

             Problem      Primary osteoarthritis of left hip M16.12             

       Active

 

             Problem      Pain in right hip M25.551                   Active

 

             Problem      Other chronic pain G89.29                    Active

 

             Problem      Primary osteoarthritis of left knee M17.12            

        Active

 

             Problem      Primary osteoarthritis of right hip M16.11            

        Active







Medications







        Medication Code    Code    Instructions Start   End     Status  Dosage



                System                  Date    Date            

 

        Calcium NDC     0                               Active  not



                                                                defined

 

        Vitamin D-3 NDC     [address]-77                         Active  not



                                                                defined

 

        Fish Oil NDC     [address]-51                         Active  not



                                                                defined

 

        Meloxicam NDC     [id number] 7.5 MG Orally                 Active  1 ta

blet



                                Once a day                         

 

        Vitamin B 12 NDC     17522-88641                         Active  not



                                                                defined







Results

No Known Results



Summary Purpose

[employer] Submission

## 2020-10-08 NOTE — XMS REPORT
Continuity of Care Document

                           Created on:2020



Patient:KWAN RUVALCABA

Sex:Female

:1955

External Reference #:[id number]





Demographics







                          Address                   [address]



                                                    [address] 



                                                    [place] [address]

 

                          Home Phone                [phone]

 

                          Email Address             [email or web address]

 

                          Preferred Language        en

 

                          Marital Status            Unknown

 

                          Mu-ism Affiliation     Unknown

 

                          Race                      Unknown

 

                          Additional Race(s)        White

 

                          Ethnic Group              Unknown









Author







                          Organization              [facility]

t

 

                          Address                   [address]



                                                    [place] [address]

 

                          Phone                     [phone]









Care Team Providers







                    Name                Role                Phone

 

                    Unavailable         Unavailable         Unavailable









Problems







       Condition Condition Condition Status Onset  Resolution Last   Treating Co

mments 

Source



       Name   Details Category        Date   Date   Treatment Clinician        



                                                 Date                 

 

       Primary Primary Problem Active                                    [facility]



       osteoarthr osteoarthr                                                  Anamaria

kes -



       itis of itis of                                                  [facility]



       left knee left knee                                                  l



                                                                      Outpati



                                                                      ent



                                                                      Clinics

 

       Female Female Problem Active                                    [facility]



       bladder bladder                                                  [facility]



       prolapse prolapse                                                  [facility]

a



                                                                      l



                                                                      Outpati



                                                                      ent



                                                                      Clinics

 

       History of History of Problem Active                                    [facility]

[facility]



       partial partial                                                  [facility]



       hysterecto hysterecto                                                  Me

moria



       my     my                                                      l



                                                                      Outpati



                                                                      ent



                                                                      Clinics

 

       Primary Primary Problem Active                                    [facility]



       osteoarthr osteoarthr                                                  Anamaria

kes -



       itis of itis of                                                  [facility]



       right hip right hip                                                  l



                                                                      Outpati



                                                                      ent



                                                                      Clinics

 

       Other  Other  Problem Active                                    [facility]



       chronic chronic                                                  [facility]



       pain   pain                                                    [facility]



                                                                      l



                                                                      Outpati



                                                                      ent



                                                                      Clinics

 

       Lumbago Lumbago Problem Active                                    [facility]



       with   with                                                    [facility]



       sciatica, sciatica,                                                  Walter

enrrique



       right side right side                                                  l



                                                                      Outpati



                                                                      ent



                                                                      Clinics

 

       Pain in Pain in Problem Active                                    [facility]



       right hip right hip                                                  [facility]

[facility] -



                                                                      [facility]



                                                                      l



                                                                      Outpati



                                                                      ent



                                                                      Clinics

 

       Primary Primary Problem Active                                    [facility]



       osteoarthr osteoarthr                                                  Anamaria

kes -



       itis of itis of                                                  [facility]



       left hip left hip                                                  l



                                                                      Outpati



                                                                      ent



                                                                      Clinics







Allergies, Adverse Reactions, Alerts

This patient has no known allergies or adverse reactions.



Medications







       Ordered Filled Start  Stop   Current Ordering Indication Dosage Frequency

 Signature

                    Comments            Components          Source



     Medication Medication Date Date Medication? Clinician                (SIG) 

          



     Name Name                                                   

 

     Vitamin B Vitamin B           Yes  Stew                not            [facility]



     12   12                  Ibanez                defined           [facility]



                                                                 [facility]



                                                                 l



                                                                 Outpati



                                                                 ent



                                                                 Clinics

 

     Calcium Calcium           Yes  Stew                not            [facility]



                              Ibanez                defined           [facility]



                                                                 [facility]



                                                                 l



                                                                 Outpati



                                                                 ent



                                                                 Clinics

 

     Fish Oil Fish Oil           Yes  Stew                not            [facility]

t



                              Ibanez                defined           [facility]



                                                                 [facility]



                                                                 l



                                                                 Outpati



                                                                 ent



                                                                 Clinics

 

     Vitamin D-3 Vitamin D-3           Yes  Stew                not           

 [facility]



                              Ibanez                defined           [facility]

 

     Meloxicam Meloxicam           Yes  Stew                1 tablet          

 [facility]



                              [facility]







Procedures

This patient has no known procedures.



Encounters







        Start   End     Encounter Admission Attending Care    Care    Encounter 

Source



        Date/Time Date/Time Type    Type    Clinicians Facility Department ID   

   

 

        2020 Outpatient                 [facility]

[id number] [facility]



        15:10:00 15:10:00                         t [facility]   [facility]



                                                                        ent



                                                                        Clinics

 

        2020 Outpatient                 [facility]

[id number] [facility]



        15:00:00 15:00:00                         t [facility]   [facility]



                                                                        ent



                                                                        [facility]

 

        2020 Outpatient                 [facility]

[id number] [facility]



        15:00:00 15:00:00                         t [facility]   [facility]



                                                                        ent



                                                                        [facility]

 

        2020 Outpatient                 [facility]

[id number] [facility]



        14:44:00 14:44:00                         t [facility]   [facility]



                                                                        ent



                                                                        [facility]

 

        2020 Outpatient                 [facility]

[id number] [facility]



        10:30:00 10:30:00                         t Bone  B[facility]         [facility]



                                                [facility]           [facility]

a



                                                [facility]         ent



                                                                        Clinics

 

        2020 Outpatient                 [facility]

[id number] [facility]



        09:44:00 09:44:00                         t Bone  B[facility] and         [facility]



                                                [facility]           [facility]

a



                                                [facility]         ent



                                                                        Clinics

 

        2020 Outpatient                 [facility]

[id number] [facility]



        09:30:00 09:30:00                         t Bone  B[facility] and         [facility]



                                                and [facility]           [facility]

a



                                                [facility]         ent



                                                                        [facility]

 

        2020 Outpatient                 [facility]

[id number] [facility]



        09:00:00 09:00:00                         t [facility]   [facility]



                                                                        ent



                                                                        Clinics

 

        2020 Outpatient                 [facility] [address]

[id number] [facility]



        11:00:00 11:00:00                         t [facility]cue   [facility]



                                                                        ent



                                                                        Clinics







Results

This patient has no known results.

## 2020-10-08 NOTE — XMS REPORT
[facility]

                            Created on:2020



Patient:Kathi Massey

Sex:Female

:1955

External Reference #:[id number]





Demographics







                          Address                   [address] [place] [address]

 

                          Phone                     [phone]

 

                          Preferred Language        en

 

                          Marital Status            Unknown

 

                          Worship Affiliation     Unknown

 

                          Race                      White

 

                          Ethnic Group              Unknown









Author







                          Organization              [facility]









Care Team Providers







                    Name                Role                Phone

 

                    Shamar Stew       Provider Role       Unavailable









Allergies

No Known Allergies



Problems







             Problem Type Condition    Code         Onset Dates  Condition Statu

s

 

             Problem      Lumbago with sciatica, right side M54.41              

      Active

 

             Problem      Female bladder prolapse N81.10                    Acti

ve

 

             Problem      History of partial hysterectomy Z90.711               

    Active

 

             Problem      Primary osteoarthritis of left hip M16.12             

       Active

 

             Problem      Pain in right hip M25.551                   Active

 

             Problem      Other chronic pain G89.29                    Active

 

             Problem      Primary osteoarthritis of left knee M17.12            

        Active

 

             Problem      Primary osteoarthritis of right hip M16.11            

        Active







Medications

No Known Medications



Results

No Known Results



Summary Purpose

[employer] Submission

## 2020-10-08 NOTE — XMS REPORT
[facility]

                            Created on:2020



Patient:Kathi Massey

Sex:Female

:1955

External Reference #:[id number]





Demographics







                          Address                   [address] [place] [address]

 

                          Phone                     [phone]

 

                          Preferred Language        en

 

                          Marital Status            Unknown

 

                          Episcopal Affiliation     Unknown

 

                          Race                      White

 

                          Ethnic Group              Unknown









Author







                          Organization              [facility]









Care Team Providers







                    Name                Role                Phone

 

                    Shamar Stew       Provider Role       Unavailable









Allergies

No Known Allergies



Problems







             Problem Type Condition    Code         Onset Dates  Condition Statu

s

 

             Problem      Lumbago with sciatica, right side M54.41              

      Active

 

             Problem      Female bladder prolapse N81.10                    Acti

ve

 

             Problem      History of partial hysterectomy Z90.711               

    Active

 

             Problem      Primary osteoarthritis of left hip M16.12             

       Active

 

             Problem      Pain in right hip M25.551                   Active

 

             Problem      Other chronic pain G89.29                    Active

 

             Problem      Primary osteoarthritis of left knee M17.12            

        Active

 

             Problem      Primary osteoarthritis of right hip M16.11            

        Active







Medications

No Known Medications



Results

No Known Results



Summary Purpose

[employer] Submission

## 2020-10-08 NOTE — XMS REPORT
[facility]

                            Created on:2020



Patient:Kathi Massey

Sex:Female

:1955

External Reference #:[id number]





Demographics







                          Address                   [address] [place] [address]

 

                          Phone                     [phone]

 

                          Preferred Language        en

 

                          Marital Status            Unknown

 

                          Adventist Affiliation     Unknown

 

                          Race                      White

 

                          Ethnic Group              Unknown









Author







                          Organization              [facility]









Care Team Providers







                    Name                Role                Phone

 

                    Stew Ibanez       Provider Role       Unavailable









Allergies, Adverse Reactions, Alerts







                    Substance           Reaction            Event Type

 

                    N.K.D.A.            Info Not Available  Non Drug Allergy







Problems







             Problem Type Condition    Code         Onset Dates  Condition Statu

s

 

             Assessment   Preoperative clearance Z01.818                   Activ

e

 

             Problem      Lumbago with sciatica, right side M54.41              

      Active

 

             Problem      Female bladder prolapse N81.10                    Acti

ve

 

             Problem      History of partial hysterectomy Z90.711               

    Active

 

             Problem      Primary osteoarthritis of left hip M16.12             

       Active

 

             Problem      Pain in right hip M25.551                   Active

 

             Problem      Other chronic pain G89.29                    Active

 

             Problem      Primary osteoarthritis of left knee M17.12            

        Active

 

             Problem      Primary osteoarthritis of right hip M16.11            

        Active

 

             Assessment   Seizure      R56.9                     Active

 

             Assessment   Primary osteoarthritis of right hip M16.11            

        Active

 

             Assessment   Female bladder prolapse N81.10                    Acti

ve

 

             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                

   Active

 

             Assessment   History of partial hysterectomy Z90.711               

    Active

 

             Assessment   Primary osteoarthritis of left knee M17.12            

        Active







Medications







        Medication Code    Code    Instructions Start   End     Status  Dosage



                System                  Date    Date            

 

        Meloxicam NDC     39501433160 7.5 MG Orally                 Active  1 ta

blet



                                Once a day                         

 

        Fish Oil NDC     [address]-70                         Active  not



                                                                defined

 

        Vitamin B 12 NDC     47426-15374                         Active  not



                                                                defined

 

        Vitamin D-3 NDC     [address]-84                         Active  not



                                                                defined

 

        Calcium NDC     0                               Active  not



                                                                defined







Results

No Known Results



Summary Purpose

[employer] Submission

## 2020-10-08 NOTE — XMS REPORT
[facility]

                            Created on:2020



Patient:Kathi Massey

Sex:Female

:1955

External Reference #:[id number]





Demographics







                          Address                   [address] [address]

 

                          Phone                     [phone]

 

                          Preferred Language        en

 

                          Marital Status            Unknown

 

                          Holiness Affiliation     Unknown

 

                          Race                      White

 

                          Ethnic Group              Unknown









Author







                          Organization              [facility]









Care Team Providers







                    Name                Role                Phone

 

                    Chamorro Adria       Provider Role       Unavailable









Allergies, Adverse Reactions, Alerts







                    Substance           Reaction            Event Type

 

                    N.K.D.A.            Info Not Available  Non Drug Allergy







Problems







             Problem Type Condition    Code         Onset Dates  Condition Statu

s

 

             Assessment   Pain of right hip joint M25.551                   Acti

ve

 

             Problem      Lumbago with sciatica, right side M54.41              

      Active

 

             Assessment   Primary osteoarthritis of left hip M16.12             

       Active

 

             Assessment   Primary osteoarthritis of right hip M16.11            

        Active

 

             Problem      Female bladder prolapse N81.10                    Acti

ve

 

             Problem      History of partial hysterectomy Z90.711               

    Active

 

             Problem      Primary osteoarthritis of left hip M16.12             

       Active

 

             Problem      Pain in right hip M25.551                   Active

 

             Problem      Other chronic pain G89.29                    Active

 

             Problem      Primary osteoarthritis of left knee M17.12            

        Active

 

             Problem      Primary osteoarthritis of right hip M16.11            

        Active







Medications







        Medication Code    Code    Instructions Start   End     Status  Dosage



                System                  Date    Date            

 

        Calcium NDC     0                               Active  not



                                                                defined

 

        Fish Oil NDC     [address]-55                         Active  not



                                                                defined

 

        Vitamin D-3 NDC     [address]-92                         Active  not



                                                                defined

 

        Vitamin B 12 NDC     47403-12635                         Active  not



                                                                defined

 

        Meloxicam NDC     82007086082 7.5 MG Orally                 Active  1 ta

blet



                                Once a day                         







Results

No Known Results



Summary Purpose

[facility] Submission

## 2020-11-04 NOTE — OP
Date of Procedure:  10/06/2020



Surgeon:  Orquidea Cooper MD



Assistant:  Kimberly Duncan.



Preoperative Diagnoses:  Anterior and apical compartment recurrent prolapse stage III and retained ov
maría and tubes.



Postoperative Diagnoses:  Extensive small bowel adhesions and apical enterocele.



Procedures Performed:  

1.Diagnostic laparoscopy, bilateral salpingo-oophorectomy, extensive lysis of small bowel adhesions 
which took more than 50% of the entire case of salpingo-oophorectomy and lysis of adhesions.  

2.Laparoscopic sacral colpopexy and cystoscopy.



Anesthesia:  General endotracheal.



Intraoperative Consultation:  Michael Alves M.D.



Specimen:  Bilateral tubes and ovaries.



Complications:  None.



Drains:  Campos catheter.



Estimated Blood Loss:  Minimal.



Condition:  Stable.



Urine Output:  300.



Findings:  Extensive small bowel adhesions all over her pelvis most likely from her hysterectomy.  He
r POP-Q was 0, +2, -1, 5, 6, 8, -3, -3, NA.



Indications:  The patient is a 65-year-old female who had prior hysterectomy with prolapse repair, ha
s recurrent prolapse, worsening over time, symptomatic with blood symptoms and bladder symptoms as we
ll, so she was evaluated and found to have mostly an anterior compartment prolapse with apical compar
tment right behind it.  Posterior compartment mostly unremarkable as well as minimal urethral mobilit
y which in the absence of her stress urinary incontinence most likely showed that her __________ prob
ably is very effective. 



The patient also reported that her tubes and ovaries are retained and so I discussed about the altern
atives of removal of tubes and ovaries since we are already going to be doing sacral colpopexy consen
ting her for a prolapse repair, discussed about the use of pessary __________ abdominal and vaginal r
epairs and the abdominal repairs __________ a graft augmented repair and she understood the benefits 
and risks and efficacy of these procedures and recurrence rates of the problem.  Then, also consented
 her for a cystoscopy.  She had a good posterior compartment.



Description Of Procedure:  After she understood all the benefits and risks, declined pessary and othe
r conservative management, she was consented for sacral colpopexy and bilateral salpingo-oophorectomy
, she was taken to the OR.  After informed consent was re-verified with the attending on side, all qu
estions were answered.  She was taken back to the OR.  2 g of Ancef were given, SCDs were placed, and
 started the patient was placed in a supine position with general anesthesia, and after this was done
 in dorsal lithotomy position using Panda stirrups, abdomen vulva vagina and perineum were prepped an
d draped in a sterile fashion.  Campos was placed to drain the bladder and attached to retrograde fill
ing and then the vaginal inflator was placed for the sacral colpopexy. 



1 cm infraumbilical incision was made with a scalpel using the open laparoscopy technique.  Fascia wa
s incised, tagged with 0 Vicryl sutures.  S-retractors were placed.  Javier was introduced.  Site of 
entry checked.  Gas was insufflated adequately and abdominal surface checked, was unremarkable.  Exte
nsive small bowel adhesions were found in the pelvic cavity through anterior abdominal walls, sidewal
ls, pelvic brims, through the vaginal cuff and lateral wall as well to the bladder. 



An 8 mm left lower quadrant and 10 mm suprapubic and 8 mm right lower quadrant ports were all placed 
under direct vision, started off from one side and started picking up the bowel with an atraumatic blessing
wel grasper, and the adhesions were taken down in a systematic fashion with the help of scissors star
jairo anteriorly related from the anterior abdominal wall and the bladder.  Then, went to the right pel
chad brim and took the adhesions down from the lateral wall, infundibulopelvic ligament tube, and all 
these were systematically taken down by creating windows using a push-spread technique and then incis
ing the adhesions to release the bowel.  Systematically, the right lateral wall dissection was perfor
med to open the pelvic cavity here releasing the bowel.  Then, at the level of the vaginal cuff, adhe
sions were taken down also in a sharp fashion making sure that the bowel stayed uninjured as well as 
the bladder, and then once all the bladder adhesions were taken down then went onto the left pelvic s
idewall, sigmoid, and all the adhesions were taken down.  Then, once the tubes and ovaries were expos
ed, they were taken down with the help of the LigaSure and specimen was handed out for permanent path
ology.  They were placed in a bag and retrieved. 



Next step was to perform a sacral colpopexy, so examination of the presacral space was done.  There w
as excellent presacral anatomy visualized.  No problems seen to proceed with the sacral colpopexy, to
 open the peritoneum anteriorly at the level of the vaginal cuff with the help of scissors.  Dissecti
on was carried along by holding the bladder up and dissecting between the bladder and the vagina, not
 getting into the vesicovaginal space itself, which would take me down one further layer, then desira
ble staying in the vesicovaginal space.  The midline dissection was performed then to the side and th
e sides were released from the vaginal cuff.  Once at least 3.5 cm of space was created between the v
aginal apex and the distal dissection on the anterior wall, this was left alone and cleared to fit th
e width of the Y-mesh. 



Then, went back posteriorly, opened the peritoneum up and retroperitoneal dissection was performed in
to the pararectal spaces, staying on the posterior vaginal wall, and taking down the rectum dropping 
it down at least to 7 cm from the vaginal apex.  Some part of the peritoneum was still left on the va
ginal apex as I did the dissection, and so once this dissection was completed, the presacral space wa
s dissected.  



Here I had assistance of Dr. Alves where opened up the presacral space by incising the peritoneum wit
h the help of the LigaSure.  Then, dissection was carried between the ureter and the iliac vessels on
 the right side, then the sigmoid colon on the left side and the root of the mesentery.  The bifurcat
ion of the aorta and the IVC were all visualized cephalad to the presacral space once the space in fr
ont of S1 was identified.  The LigaSure was used to open up the peritoneum.  Once this was done, the 
presacral space was further dissected  the loose areolar tissue, presacral fat leaving inta
ct the parasympathetic chain leaving alone the sympathetic chain.  The dissection was performed sweep
ing the nerve plexus to the left and opening up the retroperitoneum and connecting the peritoneum fro
m the presacral space all the way down to the right lateral dissection of the right pararectal space.
  Once this was connected, not getting to close the bowel and creating this space wide enough to leav
e the mesh without crinkling , then the attention was directed to preparing the mesh.  The mesh was p
repared with an 8 cm posterior vaginal length, 6 cm anterior length, then the anterior was folded ont
o the Y part of the mesh that would go to the sacral promontory.  This was sutured with the help of a
 3-0 Vicryl suture.  Then, 2-0 PDS V-Loc suture was taken.  A center stitch was placed in the posteri
or vaginal wall in the distal most aspect in the center to retain and hold the graft in place.  A fig
ure-of-eight was taken and suture was cut.  Then, 2 Prolene sutures were placed, 2-0, one on each bryan
e, and then distal fixation into the posterior vaginal wall connective tissue attempting not to penet
rate through the epithelial lining.  Once these both sutures were placed, one from the right side and
 the left side, then they were tied down. 



I had a suture pulling the epiploicae of the bowel through the left upper quadrant incision that was 
2-3 mm using a __________.  This was held for retraction. 



The 2 more 2-0 PDS V-Loc sutures were placed in the proximal part of the mesh and inferior to the vag
inal apex to position the posterior graft in place and not allowed it to slide.  Once these 2 sutures
 were placed, then down to open up the anterior arm and allow it fall over the anterior vaginal wall 
and posteriorly the sacral part of the Y mesh was taken and laid on the sacral promontory.  At least 
5-6 cm of the Y-graft was taken and then here Protac pins were placed 3 in one row and 3 in a second 
row after folding the graft.  The presacral vessels were cauterized with the help of the LigaSure bef
ore even attempting this.  There was excellent hemostasis after the mesh was tacked to the sacral pro
montory.  Then, the anterior aspect of the mesh was placed on the anterior vaginal wall.  Further dis
section __________ had to be performed to create a space for this at least a centimeter of area at th
e apex was left alone so that there would not be dyspareunia or other mesh abutting the vaginal cuff.
 



Then anteriorly in the center, 2-0 V-Loc was placed in the distalmost aspect and then two Prolene sut
ures on either sides of the distal end fixing the graft __________ sutured to the anterior vaginal wa
ll, making all attempts to not try and penetrate into the vaginal wall. 



Then, 2 more sutures were placed to stabilize the graft in the proximal part.  Then, after thorough i
rrigation and suction were performed, a well-positioned pelvic exam was performed.  I was very satisf
ied with the way that the prolapse had pulled up.  There was excellent anterior and apical support, s
o at this time the case was mostly concluded.  Coming back to the abdominal part, all the trocars wer
e removed after the peritoneum was closed with the help of 3-0 V-Loc PDS in a continuous running fash
ion all the way from the sacrum to the base of the uterosacrals.  Then, sutured laterally to the righ
t side and then from the left to right.  Once all this was done, thorough irrigation and suction were
 performed.  No evidence of electrical thermal injury to the ureters.  Then, this procedure was concl
uded.  All the adhesions of the small bowel appeared to be hemostatic and not much bleeding in the pe
lvic cavity, less than 50 mL.  Urine output was 350 and fluid used for resuscitation 2800 Ringer's la
ctate.  Once all the procedure was completed, the trocars were removed, and injection with 1% Marcain
e at all sites at entry and exit were done.  The bowel retracting 3-0 Monocryl was released.  There w
as no evidence of trauma to the bowel.  No exposure of the graft anywhere. 



The vaginal manipulator was removed.  Cystoscopy with 17-[place] sheath, 30-degree lens, normal saline
 was done.  Excellent jets of urine from both ureteric orifices.  No evidence of any foreign body or 
__________ in the bladder.  No need for posterior repair.  Instrument, needle, and sponge counts were
 done and were correct at the end of the case.  The patient tolerated the procedure well.  She was gi
curry a plan to follow up in 1 week.





SK/ZHOU

DD:  11/03/2020 15:07:05Voice ID:  [id number]

DT:  11/04/2020 01:28:50Report ID:  [id number]

## 2022-07-06 ENCOUNTER — HOSPITAL ENCOUNTER (EMERGENCY)
Dept: HOSPITAL 97 - ER | Age: 67
Discharge: HOME | End: 2022-07-06
Payer: MEDICARE

## 2022-07-06 VITALS — DIASTOLIC BLOOD PRESSURE: 62 MMHG | SYSTOLIC BLOOD PRESSURE: 111 MMHG

## 2022-07-06 VITALS — TEMPERATURE: 99.1 F | OXYGEN SATURATION: 96 %

## 2022-07-06 DIAGNOSIS — U07.1: Primary | ICD-10-CM

## 2022-07-06 LAB
ALBUMIN SERPL BCP-MCNC: 3.3 G/DL (ref 3.4–5)
ALP SERPL-CCNC: 106 U/L (ref 45–117)
ALT SERPL W P-5'-P-CCNC: 43 U/L (ref 12–78)
AST SERPL W P-5'-P-CCNC: 50 U/L (ref 15–37)
BUN BLD-MCNC: 14 MG/DL (ref 7–18)
GLUCOSE SERPLBLD-MCNC: 120 MG/DL (ref 74–106)
HCT VFR BLD CALC: 31.5 % (ref 36–45)
LIPASE SERPL-CCNC: 158 U/L (ref 73–393)
LYMPHOCYTES # SPEC AUTO: 0.4 K/UL (ref 0.7–4.9)
MCV RBC: 92.6 FL (ref 80–100)
PMV BLD: 7.9 FL (ref 7.6–11.3)
POTASSIUM SERPL-SCNC: 3.6 MMOL/L (ref 3.5–5.1)
RBC # BLD: 3.4 M/UL (ref 3.86–4.86)

## 2022-07-06 PROCEDURE — 83690 ASSAY OF LIPASE: CPT

## 2022-07-06 PROCEDURE — 87804 INFLUENZA ASSAY W/OPTIC: CPT

## 2022-07-06 PROCEDURE — 74177 CT ABD & PELVIS W/CONTRAST: CPT

## 2022-07-06 PROCEDURE — 81003 URINALYSIS AUTO W/O SCOPE: CPT

## 2022-07-06 PROCEDURE — 36415 COLL VENOUS BLD VENIPUNCTURE: CPT

## 2022-07-06 PROCEDURE — 80053 COMPREHEN METABOLIC PANEL: CPT

## 2022-07-06 PROCEDURE — 85025 COMPLETE CBC W/AUTO DIFF WBC: CPT

## 2022-07-06 NOTE — EDPHYS
Physician Documentation                                                                           

 [facility]                                                                 

Name: Kathi Massey                                                                                  

Age: 67 yrs                                                                                       

Sex: Female                                                                                       

: 1955                                                                                   

MRN: [id number]                                                                                   

Arrival Date: 2022                                                                          

Time: 02:33                                                                                       

Account#: [id number]                                                                            

Bed 18                                                                                            

Private MD:                                                                                       

ED Physician Guanakito Jorge                                                                         

HPI:                                                                                              

                                                                                             

03:01 This 67 yrs old Female presents to ER via Unassigned with complaints of Fever, vomiting.rn  

03:01 The patient reports fever, not measured (subjective). Onset: The symptoms/episode       rn  

      began/occurred yesterday. Modifying factors: there are no obvious modifying factors.        

      Associated signs and symptoms: Pertinent positives: chills, diarrhea, nausea, sore          

      throat, vomiting, Pertinent negatives: altered mental status, hemoptysis, skin rash,        

      swelling. Severity of symptoms: At their worst the symptoms were moderate in the            

      emergency department the symptoms are unchanged. The patient has not experienced            

      similar symptoms in the past. The patient has not recently seen a physician. Pt reports     

      not feeling well since yesterday with subjective                                            

      fever/chills/malaise/nausea/vomiting/diarrhea. NO blood in stool or emesis. [place]          

      beach all day, under umbrella. Also ate chinese food but was already feeling bad. No        

      sick contacts. .                                                                            

                                                                                                  

Historical:                                                                                       

- Allergies:                                                                                      

03:16 No Known Allergies;                                                                     ll3 

- PMHx:                                                                                           

03:16 herniated discs; Osteoporosis;                                                          ll3 

                                                                                                  

- Immunization history:: Client reports receiving the 2nd dose of the Covid vaccine.              

- Social history:: Smoking status: Patient denies any tobacco usage or history of.                

- Family history:: not pertinent.                                                                 

- Hospitalizations: : No recent hospitalization is reported.                                      

                                                                                                  

                                                                                                  

ROS:                                                                                              

03:01 Constitutional: Negative for fever, chills, and weight loss, Eyes: Negative for injury, rn  

      pain, redness, and discharge, ENT: + sore throat and congestion Neck: Negative for          

      injury, pain, and swelling, Cardiovascular: Negative for chest pain, palpitations, and      

      edema, Respiratory: Negative for shortness of breath, cough, wheezing, and pleuritic        

      chest pain, Abdomen/GI: Negative for abdominal pain, and constipation, Back: Negative       

      for injury and pain, : Negative for injury, bleeding, discharge, and swelling,            

      MS/Extremity: Negative for injury and deformity, Skin: Negative for injury, rash, and       

      discoloration, Neuro: Negative for headache, numbness, tingling, and seizure, +             

      generalized weakness                                                                        

                                                                                                  

Exam:                                                                                             

03:01 Constitutional:  This is a well developed, well nourished patient who is awake, alert,  rn  

      and in no acute distress. Head/Face:  Normocephalic, atraumatic. Eyes:  Periorbital         

      areas with no swelling, redness, or edema. ENT:  dry MM Neck:  Trachea midline, no          

      masses palpated, and no cervical lymphadenopathy.  Supple, full range of motion without     

      nuchal rigidity, or vertebral point tenderness.  No Meningismus. Cardiovascular:            

      Tachycardic, regular.  No pulse deficits. Respiratory:  No increased work of breathing,     

      no retractions or nasal flaring. Abdomen/GI:  Soft, non-tender, non-distended Skin:         

      Warm, dry MS/ Extremity:  Pulses equal, no cyanosis. Neuro:  Awake and alert, GCS 15,       

      oriented to person, place, time, and situation.  Cranial nerves II-XII grossly intact.      

      Motor strength 4/5 in all extremities.  Sensory grossly intact.  Cerebellar exam            

      normal.                                                                                     

                                                                                                  

Vital Signs:                                                                                      

03:07  / 63; Pulse 94; Resp 17; Temp 99.1(O); Pulse Ox 96% on R/A; Weight 90.72 kg (R); ll3 

      Height 5 ft. 2 in. (157.48 cm) (R);                                                         

03:09  / 61; Pulse 88; Resp 18; Pulse Ox 96% on R/A;                                    ll3 

05:12  / 62; Pulse 87; Resp 17; Pulse Ox 96% on R/A;                                    ll3 

03:07 Body Mass Index 36.58 (90.72 kg, 157.48 cm)                                             ll3 

                                                                                                  

MDM:                                                                                              

02:35 Patient medically screened.                                                             rn  

04:44 ED course: Spoke with patient regarding Paxlovid, pt declines due to unknown side       rn  

      effects and cost. Will dc home with return precautions once CT abdomen returns              

      negative. .                                                                                 

05:16 Differential diagnosis: viral Infection, bacterial infection, URI, UTI,                 rn  

      gastroenteritis. Data reviewed: vital signs, nurses notes, lab test result(s),              

      radiologic studies, CT scan, and as a result, I will discharge patient. Counseling: I       

      had a detailed discussion with the patient and/or guardian regarding: the historical        

      points, exam findings, and any diagnostic results supporting the discharge/admit            

      diagnosis, lab results, radiology results, the need for outpatient follow up, to return     

      to the emergency department if symptoms worsen or persist or if there are any questions     

      or concerns that arise at home. Response to treatment: the patient's symptoms have          

      markedly improved after treatment, and as a result, I will discharge patient. Special       

      discussion: I discussed with the patient/guardian in detail that at this point there is     

      no indication for admission to the hospital. It is understood, however, that if the         

      symptoms persist or worsen the patient needs to return immediately for re-evaluation.       

                                                                                                  

                                                                                             

02:35 Order name: CBC with Diff; Complete Time: 04:                                         rn  

                                                                                             

02:35 Order name: CMP; Complete Time: :                                                   rn  

                                                                                             

02:35 Order name: Lipase; Complete Time: :                                                rn  

                                                                                             

02:37 Order name: COVID-19 SARS RT PCR (Document "Date of Onset" if Symptomatic); Complete    mw2 

      Time:                                                                                              

02:37 Order name: Flu                                                                         mw2 

                                                                                             

02:55 Order name: Urine Microscopic Only                                                      rn  

                                                                                             

02:35 Order name: CT Abd/Pelvis - IV Contrast Only                                            rn  

                                                                                             

02:35 Order name: IV Saline Lock; Complete Time: 03:07                                        rn  

                                                                                             

02:35 Order name: Labs collected and sent; Complete Time: 03:                               rn  

                                                                                             

02:55 Order name: Urine Dipstick-Ancillary (obtain specimen); Complete Time: 05:10            rn  

                                                                                             

05:19 Order name: Urine Dipstick-Ancillary                                                    EDMS

                                                                                                  

Administered Medications:                                                                         

03:07 Drug: NS 0.9% 1000 ml Route: IV; Rate: 1 bolus; Site: right antecubital;                ll3 

05:13 Follow up: Response: No adverse reaction; IV Status: Completed infusion; IV Intake:     ll3 

      1000ml                                                                                      

05:05 Drug: Zofran (Ondansetron) 4 mg Route: IVP; Site: right antecubital;                    ll3 

05:54 Follow up: Response: No adverse reaction                                                ll3 

05:08 Drug: NS 0.9% 500 ml Route: IV; Rate: bolus; Site: right antecubital;                   ll3 

05:54 Follow up: Response: No adverse reaction; IV Status: Completed infusion; IV Intake:     ll3 

      500ml                                                                                       

                                                                                                  

                                                                                                  

Disposition Summary:                                                                              

22 05:16                                                                                    

Discharge Ordered                                                                                 

      Location: Home                                                                          rn  

      Problem: new                                                                            rn  

      Symptoms: have improved                                                                 rn  

      Condition: Stable                                                                       rn  

      Diagnosis                                                                                   

        - SARS-associated coronavirus as the cause of diseases classified elsewhere           rn  

        - Nausea with vomiting, unspecified                                                   rn  

      Followup:                                                                               rn  

        - With: Private Physician                                                                  

        - When: As needed                                                                          

        - Reason: Recheck today's complaints, Re-evaluation by your physician                      

      Discharge Instructions:                                                                     

        - Discharge Summary Sheet                                                             rn  

        - Nausea and Vomiting, Adult                                                          rn  

        - COVID-19                                                                            rn  

        - 10 Things You Can Do to Manage Your COVID-19 Symptoms at Home - [facility]                 rn  

        - Viral Illness, Adult                                                                rn  

        - Prevent the Spread of COVID-19 if You Are Sick - [facility]                                rn  

      Forms:                                                                                      

        - Medication Reconciliation Form                                                      rn  

        - Thank You Letter                                                                    rn  

        - Antibiotic Education                                                                rn  

        - Prescription Opioid Use                                                             rn  

      Prescriptions:                                                                              

        - ondansetron 4 mg Oral tablet,disintegrating                                              

            - place 1 tablet by TRANSLINGUAL route every 8 hours As needed; 20 tablet;        rn  

      Refills: 0, Product Selection Permitted                                                     

Signatures:                                                                                       

Dispatcher [employer]                           Guanakito Colbert MD MD rn Loubet, Lynsea RN                      RN   ll3                                                  

                                                                                                  

**************************************************************************************************

## 2022-07-06 NOTE — RAD REPORT
EXAM DESCRIPTION:  CT - Abdomen   Pelvis W Contrast - 7/6/2022 6:29 am

 

CLINICAL HISTORY:  67 years Female Abdominal pain, acute, nonlocalized

 

TECHNIQUE:  Axial CT imaging of the abdomen and pelvis was performed following the administration of 
intravenous contrast.. Oral contrast was not administered.   Sagittal and coronal reconstructed image
s were then performed.   The CT study is performed according to ALARA (as low as reasonably achievabl
e) or ALARA/IMAGE GENTLY, with automatic adjustment of mA and/or kV according to patient size.

Performed on: 7/6/2022 at 4:12 AM.

 

COMPARISON:  No prior studies were available for comparison.

 

FINDINGS:  Lung bases: The lung bases are clear. There is minimal fibrosis or atelectasis in the righ
t middle lobe.

Liver: The liver is mildly enlarged and measures 18.7 cm in craniocaudal dimension. No focal hepatic 
abnormalities are identified. Liver attenuation is within normal limits. The hepatic and portal veins
 are patent.

Spleen: The spleen is normal is size, configuration and attenuation.

Gallbladder and bile duct:   The gallbladder is well distended and unremarkable. There is no biliary 
ductal dilatation.

Pancreas: The pancreas is grossly normal in size and configuration.

Adrenal Glands: The adrenal glands are normal in size and configuration.

Kidneys: The kidneys are normal in size and configuration. There is no evidence of hydronephrosis. Th
ere are punctate bilateral nonobstructing renal calculi best appreciated on the coronal images. No de
finite solid or cystic renal mass lesions are identified.

Stomach: The stomach is grossly normal. There is no definite hiatal hernia.

Bowel: The bowel gas pattern is non specific and non obstructive. There is very mild submucosal fat d
eposition along the ascending colon which is nonspecific but can be seen with chronic inflammatory blessing
wel disease.

Appendix: There is no CT evidence to suggest acute appendicitis.

Free air: There is no evidence of free air.

Free fluid: There is no evidence of free fluid.

Vasculature: The aorta is normal in caliber and contour. The inferior vena cava is grossly unremarkab
le.

Lymphadenopathy: No pathologic lymphadenopathy is identified.

Bladder: The bladder is well distended and smooth in contour.

Reproductive: The uterus is surgically absent.

Bones: No acute osseous abnormalities are identified. There is trace anterolisthesis of L4 relative t
o L5. There is moderate degenerative disc disease at L5-S1 and mild degenerative changes throughout t
he remainder of the visualized thoracolumbar spine

Soft tissues: No acute soft tissue abnormalities are identified. There is a very small fat-containing
 ventral umbilical hernia.

 

IMPRESSION:  1.   No evidence of acute intra-abdominal or intrapelvic pathology.

2.   Mild hepatomegaly.

3.   Punctate bilateral nonobstructing renal calculi.

4.   Status post hysterectomy.

5.   Trace anterolisthesis of L4 relative to L5 with moderate degenerative disc disease at L5-S1.

6.   Very small fat-containing ventral umbilical hernia.

7.   Mild submucosal fat deposition along the ascending colon which is nonspecific but can be seen wi
th chronic inflammatory bowel disease.

 

Electronically signed by:   Sherin Joaquin DO   7/6/2022 5:03 AM CDT Workstation: [id number]

 

 

 

 

Due to temporary technical issues with the PACS/Fluency reporting system, reports are being signed by
 the in house radiologists without review as a courtesy to insure prompt reporting. The interpreting 
radiologist is fully responsible for the content of the report.

## 2022-07-06 NOTE — ER
Nurse's Notes                                                                                     

 [facility]                                                                 

Name: Kathi Massey                                                                                  

Age: 67 yrs                                                                                       

Sex: Female                                                                                       

: 1955                                                                                   

MRN: [id number]                                                                                   

Arrival Date: 2022                                                                          

Time: 02:33                                                                                       

Account#: [id number]                                                                            

Bed 18                                                                                            

Private MD:                                                                                       

Diagnosis: SARS-associated coronavirus as the cause of diseases classified elsewhere;Nausea with  

  vomiting, unspecified                                                                           

                                                                                                  

Presentation:                                                                                     

                                                                                             

03:07 Chief complaint: Patient states: Ate at a [employer] buff[employer], c/o diarrhea since last       ll3 

      night, c/o sinus pressure, congestion, sore throat and fevers at home. Coronavirus          

      screen: Vaccine status: Patient reports receiving the 2nd dose of the covid vaccine.        

      congestion, fever. Ebola Screen: No symptoms or risks identified at this time. Initial      

      Sepsis Screen: Does the patient meet any 2 criteria? No. Patient's initial sepsis           

      screen is negative. Does the patient have a suspected source of infection? No.              

      Patient's initial sepsis screen is negative. Risk Assessment: Do you want to hurt           

      yourself or someone else? Patient reports no desire to harm self or others. Onset of        

      symptoms was 2022.                                                                 

03:07 Method Of Arrival: Wheelchair                                                           ll3 

03:07 Acuity: NNEKA 3                                                                           ll3 

                                                                                                  

Triage Assessment:                                                                                

02:45 General: Appears uncomfortable, Behavior is calm, cooperative, Reports fever for. Pain: ll3 

      Denies pain. EENT: Reports pain when swallowing. Neuro: Level of Consciousness is           

      awake, alert, obeys commands, Oriented to person, place, time, situation. Respiratory:      

      Respiratory effort is even, unlabored, Respiratory pattern is regular, symmetrical. GI:     

      Abdomen is round non-distended, Reports diarrhea. Derm: Skin is pink, warm \T\ dry.         

                                                                                                  

Historical:                                                                                       

- Allergies:                                                                                      

03:16 No Known Allergies;                                                                     ll3 

- PMHx:                                                                                           

03:16 herniated discs; Osteoporosis;                                                          ll3 

                                                                                                  

- Immunization history:: Client reports receiving the 2nd dose of the Covid vaccine.              

- Social history:: Smoking status: Patient denies any tobacco usage or history of.                

- Family history:: not pertinent.                                                                 

- Hospitalizations: : No recent hospitalization is reported.                                      

                                                                                                  

                                                                                                  

Screenin:18 Abuse screen: Denies threats or abuse. Nutritional screening: No deficits noted.        ll3 

      Tuberculosis screening: No symptoms or risk factors identified. Fall Risk No fall in        

      past 12 months (0 pts). No secondary diagnosis (0 pts). IV access (20 points).              

      Ambulatory Aid- None/Bed Rest/Nurse Assist (0 pts). Gait- Mental Status- Oriented to        

      own ability (0 pts). Total Veras Fall Scale indicates No Risk (0-24 pts).                   

                                                                                                  

Assessment:                                                                                       

03:07 General: See triage assessment.                                                         ll3 

04:00 Reassessment: No changes from previously documented assessment. Patient and/or family   ll3 

      updated on plan of care and expected duration. Pain level reassessed. Patient is alert,     

      oriented x 3, equal unlabored respirations, skin warm/dry/pink.                             

05:10 Reassessment: No changes from previously documented assessment. Patient and/or family   ll3 

      updated on plan of care and expected duration. Pain level reassessed. Patient is alert,     

      oriented x 3, equal unlabored respirations, skin warm/dry/pink.                             

                                                                                                  

Vital Signs:                                                                                      

03:07  / 63; Pulse 94; Resp 17; Temp 99.1(O); Pulse Ox 96% on R/A; Weight 90.72 kg (R); ll3 

      Height 5 ft. 2 in. (157.48 cm) (R);                                                         

03:09  / 61; Pulse 88; Resp 18; Pulse Ox 96% on R/A;                                    ll3 

05:12  / 62; Pulse 87; Resp 17; Pulse Ox 96% on R/A;                                    ll3 

03:07 Body Mass Index 36.58 (90.72 kg, 157.48 cm)                                             ll3 

                                                                                                  

ED Course:                                                                                        

02:33 Patient arrived in ED.                                                                  ag3 

02:34 Guanakito Jorge MD is Attending Physician.                                                rn  

02:45 Arm band placed on Patient placed in an exam room.                                      ll3 

03:07 Chevy Garza, RN is Primary Nurse.                                                    ll3 

03:07 Inserted saline lock: 22 gauge in right antecubital area, using aseptic technique.      ll3 

      Blood collected.                                                                            

03:16 Triage completed.                                                                       ll3 

03:19 No provider procedures requiring assistance completed.                                  ll3 

04:21 CT Abd/Pelvis - IV Contrast Only In Process Unspecified.                                EDMS

05:13 Patient has correct armband on for positive identification. Bed in low position. Call   ll3 

      light in reach. Side rails up X 1.                                                          

05:54 IV discontinued, intact, bleeding controlled, No redness/swelling at site. Pressure     ll3 

      dressing applied.                                                                           

                                                                                                  

Administered Medications:                                                                         

03:07 Drug: NS 0.9% 1000 ml Route: IV; Rate: 1 bolus; Site: right antecubital;                ll3 

05:13 Follow up: Response: No adverse reaction; IV Status: Completed infusion; IV Intake:     ll3 

      1000ml                                                                                      

05:05 Drug: Zofran (Ondansetron) 4 mg Route: IVP; Site: right antecubital;                    ll3 

05:54 Follow up: Response: No adverse reaction                                                ll3 

05:08 Drug: NS 0.9% 500 ml Route: IV; Rate: bolus; Site: right antecubital;                   ll3 

05:54 Follow up: Response: No adverse reaction; IV Status: Completed infusion; IV Intake:     ll3 

      500ml                                                                                       

                                                                                                  

                                                                                                  

Medication:                                                                                       

05:13 VIS not applicable for this client.                                                     ll3 

                                                                                                  

Intake:                                                                                           

05:13 IV: 1000ml; Total: 1000ml.                                                              ll3 

05:54 IV: 500ml; Total: 1500ml.                                                               ll3 

                                                                                                  

Outcome:                                                                                          

05:16 Discharge ordered by MD.                                                                rn  

05:54 Discharged to home via wheelchair, with family, with significant other.                 ll3 

05:54 Condition: stable                                                                           

05:54 Discharge instructions given to patient, family, significant other, Instructed on           

      discharge instructions, follow up and referral plans. medication usage, Demonstrated        

      understanding of instructions, follow-up care, medications, Prescriptions given X 1.        

05:55 Patient left the ED.                                                                    ll3 

                                                                                                  

Signatures:                                                                                       

Dispatcher [employer]                           EDMS                                                 

Guanakito Jorge MD MD rn Gomez, Alice ag3 Loubet, Lynsea, RN                      RN   ll3                                                  

                                                                                                  

Corrections: (The following items were deleted from the chart)                                    

05:13 03:45  / 61; Pulse 88bpm; Resp 18bpm; Pulse Ox 96% RA; ll3                        ll3 

                                                                                                  

**************************************************************************************************